# Patient Record
Sex: MALE | Race: WHITE | Employment: FULL TIME | URBAN - METROPOLITAN AREA
[De-identification: names, ages, dates, MRNs, and addresses within clinical notes are randomized per-mention and may not be internally consistent; named-entity substitution may affect disease eponyms.]

---

## 2022-09-22 ENCOUNTER — TRANSFERRED RECORDS (OUTPATIENT)
Dept: HEALTH INFORMATION MANAGEMENT | Facility: CLINIC | Age: 62
End: 2022-09-22

## 2022-09-22 LAB
ALT SERPL-CCNC: 31 IU/L (ref 0–44)
AST SERPL-CCNC: 28 IU/L (ref 0–40)
CHOLESTEROL (EXTERNAL): 215 MG/DL (ref 100–199)
CREATININE (EXTERNAL): 0.91 MG/DL (ref 0.76–1.27)
GFR ESTIMATED (EXTERNAL): 96 ML/MIN/1.73
GLUCOSE (EXTERNAL): 87 MG/DL (ref 65–99)
HDLC SERPL-MCNC: 43 MG/DL
LDL CHOLESTEROL (EXTERNAL): 152 MG/DL (ref 0–99)
POTASSIUM (EXTERNAL): 4.4 MMOL/L (ref 3.5–5.2)
TRIGLYCERIDES (EXTERNAL): 113 MG/DL (ref 0–149)

## 2022-09-27 DIAGNOSIS — E78.5 HYPERLIPEMIA: Primary | ICD-10-CM

## 2023-01-03 ENCOUNTER — HOSPITAL ENCOUNTER (OUTPATIENT)
Dept: CT IMAGING | Facility: CLINIC | Age: 63
Discharge: HOME | End: 2023-01-03
Attending: FAMILY MEDICINE | Admitting: FAMILY MEDICINE

## 2023-01-03 DIAGNOSIS — E78.5 HYPERLIPEMIA: ICD-10-CM

## 2023-01-03 PROCEDURE — 75571 CT HRT W/O DYE W/CA TEST: CPT

## 2023-01-03 PROCEDURE — 75571 CT HRT W/O DYE W/CA TEST: CPT | Mod: 26 | Performed by: INTERNAL MEDICINE

## 2023-09-22 ENCOUNTER — HOSPITAL ENCOUNTER (EMERGENCY)
Facility: CLINIC | Age: 63
Discharge: HOME | End: 2023-09-22
Attending: PHYSICIAN ASSISTANT | Admitting: PHYSICIAN ASSISTANT
Payer: COMMERCIAL

## 2023-09-22 VITALS
DIASTOLIC BLOOD PRESSURE: 85 MMHG | RESPIRATION RATE: 18 BRPM | OXYGEN SATURATION: 100 % | SYSTOLIC BLOOD PRESSURE: 133 MMHG | HEART RATE: 72 BPM | TEMPERATURE: 97.6 F

## 2023-09-22 DIAGNOSIS — K08.89 PAIN, DENTAL: ICD-10-CM

## 2023-09-22 PROCEDURE — 99284 EMERGENCY DEPT VISIT MOD MDM: CPT

## 2023-09-22 RX ORDER — CHLORHEXIDINE GLUCONATE ORAL RINSE 1.2 MG/ML
15 SOLUTION DENTAL 2 TIMES DAILY
Qty: 118 ML | Refills: 0 | Status: SHIPPED | OUTPATIENT
Start: 2023-09-22

## 2023-09-22 RX ORDER — IBUPROFEN 800 MG/1
800 TABLET, FILM COATED ORAL EVERY 8 HOURS PRN
Qty: 15 TABLET | Refills: 0 | Status: SHIPPED | OUTPATIENT
Start: 2023-09-22

## 2023-09-23 NOTE — ED TRIAGE NOTES
"Patient reports lower right sided dental pain.  He reports the pain started early in the week.  Per pt report \"I think I have an abscess\".  ABCs intact, A&Ox4.     Triage Assessment       Row Name 09/22/23 1914       Triage Assessment (Adult)    Airway WDL WDL       Respiratory WDL    Respiratory WDL WDL       Skin Circulation/Temperature WDL    Skin Circulation/Temperature WDL WDL       Cardiac WDL    Cardiac WDL WDL       Peripheral/Neurovascular WDL    Peripheral Neurovascular WDL WDL       Cognitive/Neuro/Behavioral WDL    Cognitive/Neuro/Behavioral WDL WDL                    "

## 2023-09-23 NOTE — ED PROVIDER NOTES
History     Chief Complaint:  Dental Pain    HPI   Pablito Stuart is a 62 year old male with no pertinent past medical history who presents with onset of right-sided dental pain. Patient reports that he was fishing in [place] this past week; one day while out on the boat he ate a candy bar and reports onset of lower right-sided dental pain soon afterwards. States it was fine later in the day. That evening when he laid down, his pain worsened and he experienced onset of right ear pain as well that he thought may have been an ear infection. He states that his pain has been worse at night while laying down. States that he came home last night and decided to continue treating the pain at home. He reports that he used ice packs on the right side of his face, rotated Tylenol and Naproxen, and rinsed with salt water. Endorses some pain in his right lymph node, right ear, and right eye pressure at this time. He has had trouble chewing and eating and has only eaten a little applesauce today due to pain to that tooth. Endorses some chills. Reports his highest recorded temperature was 100 F, though it has gone down. Denies vomiting, shortness of breath, or chest pain. The patient does note that he called his dentist today, but the call did not go through, and he did not get a call back. Also reports that he presented to the urgent are clinic in [place] but was unable to get a walk-in appointment. Patient denies history of diabetes or hypertension. Denies smoking. He denies taking any medications including anticoagulation. Denies allergies. His wife is also at [facility]. He does have a dental appt scheduled for Monday (today is Friday nic).     Independent Historian:   None - Patient Only    Review of External Notes:   5/1/23: GI: note not available to review.  Eosinophilic esophagitis. Lymphocytic colitis.     Medications:    The patient denies any current medications.    Past Medical History:    The patient denies any prior  medical history.    Physical Exam   Patient Vitals for the past 24 hrs:   BP Temp Temp src Pulse Resp SpO2   09/22/23 1916 133/85 97.6  F (36.4  C) Temporal 72 18 100 %        Physical Exam  Vitals and nursing note reviewed.   Constitutional:       General: He is not in acute distress.     Appearance: Normal appearance. He is not ill-appearing, toxic-appearing or diaphoretic.   HENT:      Head: Normocephalic.      Right Ear: Tympanic membrane, ear canal and external ear normal.      Left Ear: Tympanic membrane, ear canal and external ear normal.      Mouth/Throat:      Mouth: Mucous membranes are moist.      Pharynx: Oropharynx is clear. No oropharyngeal exudate or posterior oropharyngeal erythema.      Comments: TTP to right low back molar. No gum swelling or fluctuance.  Very minimal trismus.  Mild TTP to right jaw line and slightly to submandibular space but no swelling, fluctuance or erythema or induration appreciated. Clear speech.  No TTP to submental space. No swelling to floor of mouth. Moving tongue freely in mouth.   Eyes:      General:         Right eye: No discharge.         Left eye: No discharge.      Conjunctiva/sclera: Conjunctivae normal.   Cardiovascular:      Rate and Rhythm: Normal rate and regular rhythm.      Pulses: Normal pulses.      Heart sounds: Normal heart sounds.   Pulmonary:      Effort: Pulmonary effort is normal. No respiratory distress.      Breath sounds: Normal breath sounds.   Musculoskeletal:         General: Normal range of motion.      Cervical back: Normal range of motion and neck supple. No rigidity.   Skin:     General: Skin is warm.      Capillary Refill: Capillary refill takes less than 2 seconds.   Neurological:      General: No focal deficit present.      Mental Status: He is alert.   Psychiatric:         Mood and Affect: Mood normal.         Behavior: Behavior normal.       Emergency Department Course     Emergency Department Course & Assessments:        Interventions:  Medications - No data to display     Assessments:  1957 PA student, Puja, evaluated the patient.  2018 I entered the patient's room and obtained history. I believe that they are safe for discharge at this time.    Independent Interpretation (X-rays, CTs, rhythm strip):  None    Consultations/Discussion of Management or Tests:  None        Social Determinants of Health affecting care:   None    Disposition:  The patient was discharged to home.     Impression & Plan      Medical Decision Making:  Very pleasant 63 y/o male with no significant PMHx presents for right dental pain that has been present for past week, pain with palpation of tooth.  See above for further details.     Exam as above.  Discussed at this time not suspected he has deep space infection (ludwigs, deep seeded abscess, nec fac, myositis, etc) and no drainable dental abscess on exam.  This is suspected to be odontogenic etiology however.  Considered sialoadenitis due to mild submandibular pain but suspect this is reactionary LAD.  I do not suspect pain is referred angina, trigeminal neuralgia, temporal arteritis, herpetic etiology/early shingles, etc. Discussed at this time I do not suspect CT imaging will  and not felt he requires admission for IV ABX.  Will trial oral ABX.  He will return here over the weekend should symptoms worsen otherwise he will F/U with his dentist as planned on Tuesday. Pt and his wife at [facility] educated on S/S that should prompt ED re-eval.  Questions answered. Verbalized understanding. Comfortable with plan and appreciative.     Diagnosis:    ICD-10-CM    1. Pain, dental  K08.89          Discharge Medications:  Discharge Medication List as of 9/22/2023  8:30 PM        START taking these medications    Details   amoxicillin-clavulanate (AUGMENTIN) 875-125 MG tablet Take 1 tablet by mouth 2 times daily for 10 days, Disp-20 tablet, R-0, E-Prescribe      chlorhexidine (PERIDEX) 0.12 % solution  Swish and spit 15 mLs in mouth 2 times daily, Disp-118 mL, R-0, E-Prescribe      ibuprofen (ADVIL/MOTRIN) 800 MG tablet Take 1 tablet (800 mg) by mouth every 8 hours as needed for moderate pain Take with food, Disp-15 tablet, R-0, E-Prescribe           Scribe Disclosure:  Corrine DUQUE Hired, am serving as a scribe at 8:26 PM on 9/22/2023 to document services personally performed by Polly Ashley PA-C based on my observations and the provider's statements to me.   9/22/2023   Polly Ashley PA-C Med[employer], Leah M, PA-C  09/22/23 2037